# Patient Record
Sex: FEMALE | Race: BLACK OR AFRICAN AMERICAN | Employment: UNEMPLOYED | URBAN - METROPOLITAN AREA
[De-identification: names, ages, dates, MRNs, and addresses within clinical notes are randomized per-mention and may not be internally consistent; named-entity substitution may affect disease eponyms.]

---

## 2024-03-12 ENCOUNTER — OFFICE VISIT (OUTPATIENT)
Dept: FAMILY MEDICINE CLINIC | Age: 19
End: 2024-03-12

## 2024-03-12 VITALS
RESPIRATION RATE: 16 BRPM | HEART RATE: 84 BPM | OXYGEN SATURATION: 94 % | SYSTOLIC BLOOD PRESSURE: 122 MMHG | WEIGHT: 191.2 LBS | HEIGHT: 63 IN | TEMPERATURE: 97.6 F | DIASTOLIC BLOOD PRESSURE: 82 MMHG | BODY MASS INDEX: 33.88 KG/M2

## 2024-03-12 DIAGNOSIS — J01.90 ACUTE NON-RECURRENT SINUSITIS, UNSPECIFIED LOCATION: Primary | ICD-10-CM

## 2024-03-12 PROCEDURE — 99203 OFFICE O/P NEW LOW 30 MIN: CPT | Performed by: NURSE PRACTITIONER

## 2024-03-12 RX ORDER — AMOXICILLIN AND CLAVULANATE POTASSIUM 875; 125 MG/1; MG/1
1 TABLET, FILM COATED ORAL 2 TIMES DAILY
Qty: 14 TABLET | Refills: 0 | Status: SHIPPED | OUTPATIENT
Start: 2024-03-12 | End: 2024-03-19

## 2024-03-12 SDOH — ECONOMIC STABILITY: FOOD INSECURITY: WITHIN THE PAST 12 MONTHS, YOU WORRIED THAT YOUR FOOD WOULD RUN OUT BEFORE YOU GOT MONEY TO BUY MORE.: NEVER TRUE

## 2024-03-12 SDOH — ECONOMIC STABILITY: INCOME INSECURITY: HOW HARD IS IT FOR YOU TO PAY FOR THE VERY BASICS LIKE FOOD, HOUSING, MEDICAL CARE, AND HEATING?: NOT HARD AT ALL

## 2024-03-12 SDOH — ECONOMIC STABILITY: FOOD INSECURITY: WITHIN THE PAST 12 MONTHS, THE FOOD YOU BOUGHT JUST DIDN'T LAST AND YOU DIDN'T HAVE MONEY TO GET MORE.: NEVER TRUE

## 2024-03-12 SDOH — ECONOMIC STABILITY: HOUSING INSECURITY
IN THE LAST 12 MONTHS, WAS THERE A TIME WHEN YOU DID NOT HAVE A STEADY PLACE TO SLEEP OR SLEPT IN A SHELTER (INCLUDING NOW)?: NO

## 2024-03-12 ASSESSMENT — ENCOUNTER SYMPTOMS
GASTROINTESTINAL NEGATIVE: 1
SORE THROAT: 0
DIARRHEA: 0
RHINORRHEA: 1
EYES NEGATIVE: 1
VOMITING: 0
WHEEZING: 0
SHORTNESS OF BREATH: 0
SINUS PRESSURE: 1
NAUSEA: 0
COUGH: 1

## 2024-03-12 ASSESSMENT — PATIENT HEALTH QUESTIONNAIRE - PHQ9
SUM OF ALL RESPONSES TO PHQ9 QUESTIONS 1 & 2: 0
SUM OF ALL RESPONSES TO PHQ QUESTIONS 1-9: 0
2. FEELING DOWN, DEPRESSED OR HOPELESS: 0
1. LITTLE INTEREST OR PLEASURE IN DOING THINGS: 0
SUM OF ALL RESPONSES TO PHQ QUESTIONS 1-9: 0

## 2024-03-12 NOTE — PROGRESS NOTES
Angela Rivera   18 y.o.  female  [id number]      Chief Complaint   Patient presents with    Sinus Problem     Onset: over a week. C/o stuffy nose, productive cough, ear pressure        Subjective:  18 y.o.female is here for an office visit. She has the following chronic/acute medical problems:There is no problem list on file for this patient.      HPI    Concern for sinus infection. Ear pressure, congestion, cough, no sore throat. Cough is worse at night. Is productive of yellow/ white mucous.   No fever. No NVD.  Has been sick for about 12 days.         Review of Systems   Constitutional:  Negative for chills, diaphoresis and fever.   HENT:  Positive for congestion, postnasal drip, rhinorrhea and sinus pressure. Negative for sore throat.    Eyes: Negative.    Respiratory:  Positive for cough. Negative for shortness of breath and wheezing.    Gastrointestinal: Negative.  Negative for diarrhea, nausea and vomiting.   Skin: Negative.    Neurological:  Positive for headaches.   All other systems reviewed and are negative.      Current Outpatient Medications   Medication Sig Dispense Refill    amoxicillin-clavulanate (AUGMENTIN) 875-125 MG per tablet Take 1 tablet by mouth 2 times daily for 7 days 14 tablet 0     No current facility-administered medications for this visit.        Past medical, family,surgical history reviewed    Objective:  /82 (Site: Left Upper Arm, Position: Sitting, Cuff Size: Medium Adult)   Pulse 84   Temp 97.6 °F (36.4 °C) (Oral)   Resp 16   Ht 1.6 m (5' 3\")   Wt 86.7 kg (191 lb 3.2 oz)   LMP 03/12/2024   SpO2 94%   BMI 33.87 kg/m²   BP Readings from Last 3 Encounters:   03/12/24 122/82     Wt Readings from Last 3 Encounters:   03/12/24 86.7 kg (191 lb 3.2 oz) (96 %, Z= 1.80)*     * Growth percentiles are based on CDC (Girls, 2-20 Years) data.         Physical Exam  Vitals reviewed.   Constitutional:       Appearance: Normal appearance. She is not ill-appearing.   HENT:      Head:

## 2024-03-25 ENCOUNTER — OFFICE VISIT (OUTPATIENT)
Dept: FAMILY MEDICINE CLINIC | Age: 19
End: 2024-03-25

## 2024-03-25 VITALS
DIASTOLIC BLOOD PRESSURE: 70 MMHG | BODY MASS INDEX: 33.3 KG/M2 | WEIGHT: 188 LBS | SYSTOLIC BLOOD PRESSURE: 100 MMHG | OXYGEN SATURATION: 98 % | HEART RATE: 79 BPM

## 2024-03-25 DIAGNOSIS — J01.90 ACUTE NON-RECURRENT SINUSITIS, UNSPECIFIED LOCATION: Primary | ICD-10-CM

## 2024-03-25 PROCEDURE — 99213 OFFICE O/P EST LOW 20 MIN: CPT | Performed by: NURSE PRACTITIONER

## 2024-03-25 RX ORDER — DOXYCYCLINE HYCLATE 100 MG
100 TABLET ORAL 2 TIMES DAILY
Qty: 20 TABLET | Refills: 0 | Status: SHIPPED | OUTPATIENT
Start: 2024-03-25 | End: 2024-04-04

## 2024-03-25 ASSESSMENT — ENCOUNTER SYMPTOMS
SHORTNESS OF BREATH: 0
RHINORRHEA: 1
SINUS PAIN: 1
GASTROINTESTINAL NEGATIVE: 1
COUGH: 1
WHEEZING: 0
SINUS PRESSURE: 1
SORE THROAT: 0

## 2024-03-25 NOTE — PROGRESS NOTES
Angela Rivera   18 y.o.  female  [id number]      Chief Complaint   Patient presents with    Sinus Problem     Pt just finished abx for sinus infection on Wednesday and reports little to no improvement to sx. Sinus pressure is better but cough and ear pressure has developed     Cough     Productive cough with thick white to yellow/green mucus         Subjective:  18 y.o.female is here for an office visit. She has the following chronic/acute medical problems:There is no problem list on file for this patient.      HPI  Seen about 2 weeks ago. Not feeling all better. Still having yellow mucous, PND and cough. Other symptoms are improving but not fully resolved. Cough, congestion, tired.     Review of Systems   Constitutional:  Negative for chills, diaphoresis and fever.   HENT:  Positive for congestion, postnasal drip, rhinorrhea, sinus pressure and sinus pain. Negative for sore throat.    Respiratory:  Positive for cough. Negative for shortness of breath and wheezing.    Cardiovascular: Negative.    Gastrointestinal: Negative.    Neurological:  Negative for headaches.   All other systems reviewed and are negative.      Current Outpatient Medications   Medication Sig Dispense Refill    doxycycline hyclate (VIBRA-TABS) 100 MG tablet Take 1 tablet by mouth 2 times daily for 10 days 20 tablet 0     No current facility-administered medications for this visit.        Past medical, family,surgical history reviewed    Objective:  /70 (Site: Left Upper Arm, Position: Sitting, Cuff Size: Medium Adult)   Pulse 79   Wt 85.3 kg (188 lb)   LMP 03/12/2024   SpO2 98%   BMI 33.30 kg/m²   BP Readings from Last 3 Encounters:   03/25/24 100/70   03/12/24 122/82     Wt Readings from Last 3 Encounters:   03/25/24 85.3 kg (188 lb) (96 %, Z= 1.75)*   03/12/24 86.7 kg (191 lb 3.2 oz) (96 %, Z= 1.80)*     * Growth percentiles are based on CDC (Girls, 2-20 Years) data.         Physical Exam  Constitutional:       Appearance: Normal

## 2024-03-27 ENCOUNTER — HOSPITAL ENCOUNTER (OUTPATIENT)
Dept: GENERAL RADIOLOGY | Age: 19
Discharge: HOME | End: 2024-03-27
Payer: COMMERCIAL

## 2024-03-27 ENCOUNTER — HOSPITAL ENCOUNTER (OUTPATIENT)
Age: 19
Discharge: HOME | End: 2024-03-27
Payer: COMMERCIAL

## 2024-03-27 DIAGNOSIS — S69.91XA HAND INJURY, RIGHT, INITIAL ENCOUNTER: Primary | ICD-10-CM

## 2024-03-27 DIAGNOSIS — S69.91XA HAND INJURY, RIGHT, INITIAL ENCOUNTER: ICD-10-CM

## 2024-03-27 PROCEDURE — 73130 X-RAY EXAM OF HAND: CPT
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
## 2024-11-22 ENCOUNTER — OFFICE VISIT (OUTPATIENT)
Dept: FAMILY MEDICINE CLINIC | Age: 19
End: 2024-11-22

## 2024-11-22 VITALS
OXYGEN SATURATION: 95 % | WEIGHT: 199.4 LBS | BODY MASS INDEX: 35.32 KG/M2 | DIASTOLIC BLOOD PRESSURE: 70 MMHG | TEMPERATURE: 97.8 F | SYSTOLIC BLOOD PRESSURE: 118 MMHG | HEART RATE: 87 BPM

## 2024-11-22 DIAGNOSIS — J34.9 SINUS PROBLEM: Primary | ICD-10-CM

## 2024-11-22 PROCEDURE — 99213 OFFICE O/P EST LOW 20 MIN: CPT | Performed by: PHYSICIAN ASSISTANT

## 2024-11-22 RX ORDER — MONTELUKAST SODIUM 10 MG/1
TABLET ORAL
COMMUNITY
Start: 2024-07-12

## 2024-11-22 RX ORDER — DOXYCYCLINE HYCLATE 100 MG
100 TABLET ORAL 2 TIMES DAILY
Qty: 20 TABLET | Refills: 0 | Status: SHIPPED | OUTPATIENT
Start: 2024-11-22 | End: 2024-12-02

## 2024-11-22 RX ORDER — PREDNISONE 20 MG/1
20 TABLET ORAL DAILY
Qty: 5 TABLET | Refills: 0 | Status: SHIPPED | OUTPATIENT
Start: 2024-11-22 | End: 2024-11-27

## 2024-11-22 ASSESSMENT — ENCOUNTER SYMPTOMS
CONSTIPATION: 0
EYE DISCHARGE: 0
COUGH: 0
DIARRHEA: 0
WHEEZING: 0
BACK PAIN: 0
COLOR CHANGE: 0
SHORTNESS OF BREATH: 0
RHINORRHEA: 0
CHEST TIGHTNESS: 0
EYE REDNESS: 0
NAUSEA: 0
SINUS PRESSURE: 1
VOMITING: 0
PHOTOPHOBIA: 0
SINUS PAIN: 1
EYE PAIN: 0
BLOOD IN STOOL: 0
ABDOMINAL PAIN: 0
SORE THROAT: 1

## 2024-11-22 NOTE — PROGRESS NOTES
Angela Rivera (:  2005) is a 19 y.o. female,Established patient, here for evaluation of the following chief complaint(s):    Sinusitis (Since Monday, did have a sore throat, )        ASSESSMENT/PLAN:  Assessment & Plan  1. Sinusitis.  Symptoms have persisted for 5 days, including sinus pressure, congestion, drainage, and ear pressure. They have been using Allegra, NyQuil, and Sudafed with temporary relief. Examination revealed fluid behind the ears and some drainage with mild tonsil swelling. They were advised to continue using Flonase, saline irrigation with Navage or nasal saline rinse, and Sudafed as needed. A 5-day course of prednisone 20 mg was prescribed to be taken in the morning or early in the day. A prescription for doxycycline was provided to be used if symptoms persist after 10 days or if symptoms worsen after completing the prednisone course. They were also advised to continue taking Claritin and to use Flonase after nasal irrigation for better absorption. If symptoms persist after 5 to 7 days, they can start taking doxycycline with food.  1. Sinus problem  -     predniSONE (DELTASONE) 20 MG tablet; Take 1 tablet by mouth daily for 5 days, Disp-5 tablet, R-0Normal  -     doxycycline hyclate (VIBRA-TABS) 100 MG tablet; Take 1 tablet by mouth 2 times daily for 10 days, Disp-20 tablet, R-0Print      No follow-ups on file.      SUBJECTIVE/OBJECTIVE:  HPI  History of Present Illness  The patient presents for evaluation of sinus pressure and congestion.    They have been experiencing symptoms since Monday, including sinus pressure, congestion, and drainage. They report significant drainage at night and ear pain and pressure but are not experiencing dizziness. They initially had a sore throat, which has since resolved. They do not have a cough.    They have a history of chronic sinus infections and are currently taking Allegra and NyQuil. Their symptoms are similar to those they experienced in March

## 2025-02-21 ENCOUNTER — HOSPITAL ENCOUNTER (OUTPATIENT)
Dept: GENERAL RADIOLOGY | Age: 20
Discharge: HOME | End: 2025-02-21
Payer: COMMERCIAL

## 2025-02-21 ENCOUNTER — HOSPITAL ENCOUNTER (OUTPATIENT)
Age: 20
Discharge: HOME | End: 2025-02-21
Payer: COMMERCIAL

## 2025-02-21 DIAGNOSIS — R52 PAIN: ICD-10-CM

## 2025-02-21 PROCEDURE — 73630 X-RAY EXAM OF FOOT: CPT

## 2025-02-21 PROCEDURE — 73610 X-RAY EXAM OF ANKLE: CPT
